# Patient Record
Sex: MALE | Race: WHITE | NOT HISPANIC OR LATINO | Employment: OTHER | URBAN - METROPOLITAN AREA
[De-identification: names, ages, dates, MRNs, and addresses within clinical notes are randomized per-mention and may not be internally consistent; named-entity substitution may affect disease eponyms.]

---

## 2018-05-30 NOTE — PATIENT DISCUSSION
"""S/P IOL OD: Tecnis ZCB00 9.5 (ORA) +ORA/Femto/Arcs +TM/Omidria. Continue post operative instructions and drops per schedule.  """

## 2018-06-13 NOTE — PATIENT DISCUSSION
"""S/P IOL OS: Tecnis ZCB00 10 (ORA) +ORA/Femto +TM. Continue post operative instructions and drops per schedule.  """

## 2023-01-26 NOTE — PATIENT DISCUSSION
Lenses are stable and well centered. Pn of lab results with  assistance, pt expressed understanding

## 2023-04-24 ENCOUNTER — PREPPED CHART (OUTPATIENT)
Dept: URBAN - METROPOLITAN AREA CLINIC 46 | Facility: CLINIC | Age: 83
End: 2023-04-24

## 2023-05-18 ENCOUNTER — COMPREHENSIVE EXAM (OUTPATIENT)
Dept: URBAN - METROPOLITAN AREA CLINIC 46 | Facility: CLINIC | Age: 83
End: 2023-05-18

## 2023-05-18 DIAGNOSIS — Z96.1: ICD-10-CM

## 2023-05-18 DIAGNOSIS — H52.7: ICD-10-CM

## 2023-05-18 DIAGNOSIS — H26.492: ICD-10-CM

## 2023-05-18 DIAGNOSIS — H04.123: ICD-10-CM

## 2023-05-18 PROCEDURE — 92004 COMPRE OPH EXAM NEW PT 1/>: CPT

## 2023-05-18 PROCEDURE — 92015 DETERMINE REFRACTIVE STATE: CPT

## 2023-05-18 ASSESSMENT — TONOMETRY
OS_IOP_MMHG: 12
OD_IOP_MMHG: 11

## 2023-05-18 ASSESSMENT — VISUAL ACUITY
OD_SC: J8
OD_SC: 20/20
OS_CC: J6
OD_CC: 20/20
OS_SC: 20/30
OS_CC: 20/25
OD_CC: J3
OS_BAT: 20/50
OS_SC: J6

## 2023-08-14 ENCOUNTER — CONSULTATION/EVALUATION (OUTPATIENT)
Dept: URBAN - METROPOLITAN AREA CLINIC 39 | Facility: CLINIC | Age: 83
End: 2023-08-14

## 2023-08-14 DIAGNOSIS — H26.492: ICD-10-CM

## 2023-08-14 DIAGNOSIS — Z96.1: ICD-10-CM

## 2023-08-14 PROCEDURE — 99214 OFFICE O/P EST MOD 30 MIN: CPT

## 2024-05-15 ENCOUNTER — PREPPED CHART (OUTPATIENT)
Dept: URBAN - METROPOLITAN AREA CLINIC 46 | Facility: CLINIC | Age: 84
End: 2024-05-15

## 2024-06-27 ENCOUNTER — PREPPED CHART (OUTPATIENT)
Dept: URBAN - METROPOLITAN AREA CLINIC 46 | Facility: CLINIC | Age: 84
End: 2024-06-27

## 2024-10-18 ENCOUNTER — COMPREHENSIVE EXAM (OUTPATIENT)
Dept: URBAN - METROPOLITAN AREA CLINIC 46 | Facility: CLINIC | Age: 84
End: 2024-10-18

## 2024-10-18 DIAGNOSIS — H52.7: ICD-10-CM

## 2024-10-18 DIAGNOSIS — H26.492: ICD-10-CM

## 2024-10-18 DIAGNOSIS — H04.123: ICD-10-CM

## 2024-10-18 DIAGNOSIS — H35.3131: ICD-10-CM

## 2024-10-18 PROCEDURE — 92015 DETERMINE REFRACTIVE STATE: CPT

## 2024-10-18 PROCEDURE — 92014 COMPRE OPH EXAM EST PT 1/>: CPT
